# Patient Record
Sex: MALE | Race: WHITE | Employment: FULL TIME | URBAN - METROPOLITAN AREA
[De-identification: names, ages, dates, MRNs, and addresses within clinical notes are randomized per-mention and may not be internally consistent; named-entity substitution may affect disease eponyms.]

---

## 2020-10-17 ENCOUNTER — HOSPITAL ENCOUNTER (EMERGENCY)
Facility: HOSPITAL | Age: 30
Discharge: HOME | End: 2020-10-17
Payer: COMMERCIAL

## 2020-10-17 VITALS
DIASTOLIC BLOOD PRESSURE: 80 MMHG | SYSTOLIC BLOOD PRESSURE: 126 MMHG | HEART RATE: 76 BPM | WEIGHT: 205 LBS | TEMPERATURE: 98 F | HEIGHT: 72 IN | RESPIRATION RATE: 20 BRPM | BODY MASS INDEX: 27.77 KG/M2 | OXYGEN SATURATION: 100 %

## 2020-10-17 DIAGNOSIS — Z20.822 CLOSE EXPOSURE TO COVID-19 VIRUS: ICD-10-CM

## 2020-10-17 DIAGNOSIS — B34.9 VIRAL ILLNESS: Primary | ICD-10-CM

## 2020-10-17 PROCEDURE — 99283 EMERGENCY DEPT VISIT LOW MDM: CPT

## 2020-10-17 NOTE — ED PROVIDER NOTES
Patient Seen in: [facility] AND [facility] Emergency Department      History   Patient presents with:  Cough/URI    Stated Complaint:     HPI    24-year-old male presents to the emergency department complaints of cough and congestion for the last 4 days.   No fev their diagnoses, expectations, follow up, and return to the ER precautions. I explained to the patient that emergent conditions may arise and to return to the ER for new, worsening or any persistent conditions.  I've explained the importance of following up

## 2020-10-17 NOTE — ED NOTES
At time of discharge, Phillip Arambula is aaox4, respirations regular and nonlabored. He is able to dress self and ambulates unassisted with steady gait. Leann Calhoun understanding of discharge instructions including follow up recommendations.

## 2020-10-17 NOTE — ED NOTES
Patient reports headache and congestion. Notes at least 6 cases of +Covid-19 at work. His respirations are regular and nonlabored. He is able to speak in full and complete sentences. AAOx4.

## (undated) NOTE — LETTER
[phone] [id number]  [phone]          Patient: Liana Winters   YOB: 1990   Date of Visit: 10/17/2020       Dear Employer,         October 17, 2020    At [place] [address], · To maximize the safety of employees, employers and clients, we encourage employers to allow self-identified high-risk patients to work from home if it is at all feasible for them to do so.  COVID-19 is especially risky for high-risk patients (including, b